# Patient Record
(demographics unavailable — no encounter records)

---

## 2024-10-16 NOTE — HISTORY OF PRESENT ILLNESS
[FreeTextEntry1] : The patient is a 70-year-old white female with a past medical history remarkable for coronary artery disease, hypertension, hypercholesterolemia, obesity, smoking, a family history of premature coronary artery disease, an abnormal ECG and recently diagnosed atrial fibrillation (8/25/2024). Holter monitoring demonstrated sinus rhythm with an average heart rate of 55 bpm.  She experienced rare APCs and PVCs.  5 symptoms corresponded to sinus rhythm.  She experienced a 46 beat run of asymptomatic SVT.  The patient developed an episode of symptomatic, rapid atrial fibrillation on October 4 that lasted for 30 minutes.  Cardizem 30 mg 1/2 tablet each evening was added to her medical regimen.  She reports that she has experienced no further episodes She has decreased her caffeine intake from 6 cups to 2 to 3 cups/day.  She has discontinued her weight loss supplement.  She consumes only 24 ounces of water daily.

## 2024-10-16 NOTE — DISCUSSION/SUMMARY
[FreeTextEntry1] : atrial fibrillation/SVT  8/25/2024, asymptomatic aside from severe fatigue, converted with IV Cardizem Anticoagulation with Eliquis  Rate control with Cardizem  mg daily  Reported 30 minutes of symptomatic atrial fibrillation 10/4/2024 Cardizem 30 mg 1/2 tablet nightly added.  No additional symptomatic episodes of A-fib reported. EP evaluation pending  Coronary artery disease. Aspirin discontinued since Eliquis was prescribed   hypertension Well-controlled.  Continue medical therapy.    hypercholesterolemia Acceptable 4/22/2024    obesity BMI 35. Diet, exercise, and weight loss rediscussed   RTO after EP evaluation

## 2024-10-16 NOTE — CARDIOLOGY SUMMARY
[de-identified] : - CAD: CARDIAC CATHETERIZATION 10/14/16, LAD 25%, PHARMACOLOGIC NUCLEAR STRESS TEST: 5/28/2024, normal, EF 74%  (4/5/2022, normal, EF >65%) - CAROTID ULTRASOUND: 5/15/2023, nonobstructive disease - ECHOCARDIOGRAM: 9/6/2024, EF 60%, LAE, mild mitral regurgitation and tricuspid regurgitation PA systolic 31 (5/15/2023, EF 60%, trace mitral regurgitation and tricuspid regurgitation RVSP 27) -Hypertension -Hypercholesterolemia -Smoker -Obesity: BMI 39 -Family history of premature CAD: Father MI 50s -Bradycardia: Asymptomatic -Atrial fibrillation: 8/25/2024 [facility], [id number] Vasc score of 4, HOLTER MONITOR: 9/9/2024, 7 days, NSR 55 () rare APCs and PVCs, 5 symptoms = SR, asymptomatic SVT 46 beats.

## 2024-10-16 NOTE — PHYSICAL EXAM

## 2024-12-17 NOTE — HISTORY OF PRESENT ILLNESS
[FreeTextEntry1] : The patient is a 71-year-old female referred for management of atrial fibrillation by Dr. Hamilton. The patient has a history of paroxysmal atrial fibrillation, HTN, obesity and non-obstructive CAD. The patient first presented to [facility] on 8/25/24 with symptomatic atrial fibrillation with RVR. She was experiencing extreme fatigue. She converted to SR after receiving IV Cardizem. She was started on Eliquis 5 mg bid for stroke prophylaxis and Cardizem 120 mg daily. She had started taking weight loss gummies in 6/2024 which she stopped at this time. She had another episode of atrial fibrillation on 10/4/2024 as noted on her Apple watch which was again associated with symptoms of fatigue. She converted on SR spontaneously after taking extra Cardizem. She has not experienced any additional episodes since then. Most recent TTE revealed preserved EF and a mildly dilated LA with mild MR.

## 2024-12-17 NOTE — DISCUSSION/SUMMARY
[FreeTextEntry1] : In summary, the patient is a 71-year-old female with a history of HTN and paroxysmal atrial fibrillation. CHADSVASC score is 3. She is currently on Eliquis 5 mg bid and Cardizem 120 mg daily. We discussed long-term management of atrial fibrillation including rate and rhythm control approaches. I recommended an ablation procedure for rhythm control, which she prefers as well. She wants to avoid long-term medical therapy. We discussed the details of the procedure including potential complications which include but are not limited to bleeding, hematoma, infection, damage to blood vessels, cardiac perforation, damage to the conduction system requiring pacemaker, MI, stroke and DVT. The patient expressed understanding and wishes to proceed. She will hold Eliquis on the day of the procedure.  [EKG obtained to assist in diagnosis and management of assessed problem(s)] : EKG obtained to assist in diagnosis and management of assessed problem(s)

## 2024-12-17 NOTE — CARDIOLOGY SUMMARY
[de-identified] : 12/17/24: sinus rhythm [de-identified] : 9/6/24: EF 60%, mildly dilated LA, mild MR, mild TR

## 2025-01-22 NOTE — DISCUSSION/SUMMARY
[FreeTextEntry1] : atrial fibrillation/SVT  8/25/2024, asymptomatic aside from severe fatigue, converted with IV Cardizem Anticoagulation with Eliquis  Rate control with Cardizem  mg daily  Reported 30 minutes of symptomatic atrial fibrillation 10/4/2024 Cardizem 30 mg 1/2 tablet nightly added.  No additional symptomatic episodes of A-fib reported. Status post EP evaluation recommending restoration of sinus rhythm with an ablation   Coronary artery disease. Aspirin discontinued since Eliquis was prescribed   hypertension Well-controlled.  Continue medical therapy.    hypercholesterolemia Acceptable 4/22/2024    obesity BMI 36 Diet, exercise, and weight loss rediscussed   RTO 6 months

## 2025-01-22 NOTE — CARDIOLOGY SUMMARY
[de-identified] : - CAD: CARDIAC CATHETERIZATION 10/14/16, LAD 25%, PHARMACOLOGIC NUCLEAR STRESS TEST: 5/28/2024, normal, EF 74%  (4/5/2022, normal, EF >65%) - CAROTID ULTRASOUND: 5/15/2023, nonobstructive disease - ECHOCARDIOGRAM: 9/6/2024, EF 60%, LAE, mild mitral regurgitation and tricuspid regurgitation PA systolic 31 (5/15/2023, EF 60%, trace mitral regurgitation and tricuspid regurgitation RVSP 27) -Hypertension -Hypercholesterolemia -Smoker -Obesity: BMI 39 -Family history of premature CAD: Father MI 50s -Bradycardia: Asymptomatic -Atrial fibrillation: 8/25/2024 [facility], [id number] Vasc score of 4, HOLTER MONITOR: 9/9/2024, 7 days, NSR 55 () rare APCs and PVCs, 5 symptoms = SR, asymptomatic SVT 46 beats.

## 2025-01-22 NOTE — HISTORY OF PRESENT ILLNESS
[FreeTextEntry1] : The patient is a 71-year-old white female with a past medical history remarkable for coronary artery disease, hypertension, hypercholesterolemia, obesity, smoking, a family history of premature coronary artery disease, an abnormal ECG and recently diagnosed atrial fibrillation (8/25/2024). Holter monitoring demonstrated sinus rhythm with an average heart rate of 55 bpm.  She experienced rare APCs and PVCs.  5 symptoms corresponded to sinus rhythm.  She experienced a 46 beat run of asymptomatic SVT. The patient developed an episode of symptomatic, rapid atrial fibrillation on October 4 that lasted for 30 minutes.  Cardizem 30 mg 1/2 tablet each evening was added to her medical regimen.  She reports that she has experienced no further episodes She has decreased her caffeine intake from 6 cups to 2 to 3 cups/day.  She has discontinued her weight loss supplement.   The patient has experienced no further episodes of palpitations.  She is scheduled for an atrial fibrillation ablation next month

## 2025-01-22 NOTE — PHYSICAL EXAM

## 2025-03-15 NOTE — PHYSICAL EXAM
[No Acute Distress] : no acute distress [Normal S1, S2] : normal S1, S2 [Clear Lung Fields] : clear lung fields [No Respiratory Distress] : no respiratory distress  [No Edema] : no edema [No Rash] : no rash [Moves all extremities] : moves all extremities [Alert and Oriented] : alert and oriented

## 2025-03-17 NOTE — HISTORY OF PRESENT ILLNESS
[FreeTextEntry1] : The patient is a 71-year-old female referred for management of atrial fibrillation by Dr. Hamilton. The patient has a history of paroxysmal atrial fibrillation, HTN, obesity and non-obstructive CAD. She is now s/p elective ablation for AF (WACA/PVI) on 2/21/25 with Dr. Barlow.    Of note, The patient first presented to [facility] on 8/25/24 with symptomatic atrial fibrillation with RVR. She was experiencing extreme fatigue. She converted to SR after receiving IV Cardizem. She was started on Eliquis 5 mg bid for stroke prophylaxis and Cardizem 120 mg daily. She had started taking weight loss gummies in 6/2024 which she stopped at this time. She had another episode of atrial fibrillation on 10/4/2024 as noted on her Apple watch which was again associated with symptoms of fatigue. She converted to SR spontaneously after taking extra Cardizem. She has not experienced any additional episodes since then. Most recent TTE revealed preserved EF and a mildly dilated LA with mild MR.   Patient presents to the office today s/p AF ablation. Since the procedure, she reports feeling well. She has one brief episode two-days post procedure where her HR was 150 bpm, recorded on her apple watch. Event was self-limiting.  Since then, she has denied symptoms of arrhythmia without c/o palpitations, chest pain/tightness, dizziness, syncope or near syncope.  She has remained on Eliquis 5 mg BID and is tolerating without bleeding events or falls. Cardizem was discontinued post procedure for bradycardia.   B/L groin sites are healed well approximated without erythema, edema, drainage, exudate or signs of infection.  EKG today reveals:  SB 52 bpm.

## 2025-03-17 NOTE — CARDIOLOGY SUMMARY
[de-identified] : 3/17/25: SB HR 52 bpm  12/17/24: sinus rhythm [de-identified] : 9/6/24: EF 60%, mildly dilated LA, mild MR, mild TR

## 2025-03-17 NOTE — DISCUSSION/SUMMARY
[FreeTextEntry1] : 71 with paroxysmal atrial fibrillation, HTN, obesity and non-obstructive CAD, who presented electively today 2/21/25 for and is now s/p elective Pulsed fieldablation of atrial fibrillation (WACA / PVI) on 2/21/25 with Dr. Barlow.   Ms Jasmine presented to the office today s/p ablation. Overall, she has been feeling very well. She has remained free from arrhythmia symptoms. She had one self limiting episode of AF/High HR in the 150's two days post procedure. B/L groin sites are now healed well without s/s of infection or complications. She has been maintained on Eliquis for OAC and will continue this. She is maintaining SR on EKG today.   Recommendation - Continue Eliquis 5 mg BID - Repeat Holter monitor for 1 week 3 mos post ablation (May 2025) -F/u with Dr. Hamilton -EP f/u in 6 months or sooner PRN.    [EKG obtained to assist in diagnosis and management of assessed problem(s)] : EKG obtained to assist in diagnosis and management of assessed problem(s)

## 2025-03-17 NOTE — HISTORY OF PRESENT ILLNESS
[FreeTextEntry1] : The patient is a 71-year-old female referred for management of atrial fibrillation by Dr. Hamilton. The patient has a history of paroxysmal atrial fibrillation, HTN, obesity and non-obstructive CAD. She is now s/p elective ablation for AF (WACA/PVI) on 2/21/25 with Dr. Barlow.    Of note, The patient first presented to [facility] on 8/25/24 with symptomatic atrial fibrillation with RVR. She was experiencing extreme fatigue. She converted to SR after receiving IV Cardizem. She was started on Eliquis 5 mg bid for stroke prophylaxis and Cardizem 120 mg daily. She had started taking weight loss gummies in 6/2024 which she stopped at this time. She had another episode of atrial fibrillation on 10/4/2024 as noted on her Apple watch which was again associated with symptoms of fatigue. She converted to SR spontaneously after taking extra Cardizem. She has not experienced any additional episodes since then. Most recent TTE revealed preserved EF and a mildly dilated LA with mild MR.   Patient presents to the office today s/p AF ablation. Since the procedure, she reports feeling well. She has one brief episode two-days post procedure where her HR was 150 bpm, recorded on her apple watch. Event was self-limiting.  Since then, she has denied symptoms of arrhythmia without c/o palpitations, chest pain/tightness, dizziness, syncope or near syncope.  She has remained on Eliquis 5 mg BID and is tolerating without bleeding events or falls. Cardizem was discontinued post procedure for bradycardia.   B/L groin sites are healed well approximated without erythema, edema, drainage, exudate or signs of infection.  EKG today reveals:  SB 52 bpm.  Meds and therapy

## 2025-03-17 NOTE — CARDIOLOGY SUMMARY
[de-identified] : 3/17/25: SB HR 52 bpm  12/17/24: sinus rhythm [de-identified] : 9/6/24: EF 60%, mildly dilated LA, mild MR, mild TR

## 2025-03-17 NOTE — REVIEW OF SYSTEMS
[Negative] : Heme/Lymph [Feeling Fatigued] : not feeling fatigued [SOB] : no shortness of breath [Dyspnea on exertion] : not dyspnea during exertion [Chest Discomfort] : no chest discomfort [Lower Ext Edema] : no extremity edema [Palpitations] : no palpitations [Syncope] : no syncope [Cough] : no cough [Dizziness] : no dizziness [Easy Bleeding] : no tendency for easy bleeding [Easy Bruising] : no tendency for easy bruising [FreeTextEntry5] : see HPI

## 2025-06-18 NOTE — CARDIOLOGY SUMMARY
[de-identified] : - CAD: CARDIAC CATHETERIZATION 10/14/2016, LAD 25%, PHARMACOLOGIC NUCLEAR STRESS TEST: 5/28/2024, normal, EF 74%  - CAROTID ULTRASOUND: 5/15/2023, nonobstructive disease - ECHOCARDIOGRAM: 9/6/2024, EF 60%, LAE, mild mitral regurgitation and tricuspid regurgitation PA systolic 31  -Hypertension -Hypercholesterolemia -Smoker -Morbid Obesity: BMI 44 -Family history of premature CAD: Father MI 50s -Bradycardia: Asymptomatic -Atrial fibrillation: 8/25/2024 [facility], [id number] Vasc score of 4, atrial fibrillation ablation 2/21/2025  (HOLTER MONITOR: 9/9/2024, 7 days, NSR 55 () rare APCs and PVCs, 5 symptoms = SR, asymptomatic SVT 46 beats)

## 2025-06-18 NOTE — DISCUSSION/SUMMARY
[FreeTextEntry1] : atrial fibrillation/SVT  Status post AFib ablation 2/21/2025  Post ablation Holter monitoring demonstrated sustained SVT.   EP reevaluation pending   Coronary artery disease. Aspirin discontinued since Eliquis was prescribed   hypertension Well-controlled.  Continue medical therapy.    hypercholesterolemia Acceptable 4/22/2024.  Fasting lipids requested from the patient's PCP  Morbid obesity BMI 44 Obesity consultation ordered   RTO 6 months

## 2025-06-18 NOTE — PHYSICAL EXAM

## 2025-06-18 NOTE — HISTORY OF PRESENT ILLNESS
[FreeTextEntry1] : The patient is a 71-year-old white female with a past medical history remarkable for coronary artery disease, hypertension, hypercholesterolemia, obesity, smoking, a family history of premature coronary artery disease, an abnormal ECG and atrial fibrillation s/p ablation 2/21/2025. The patient's cholesterol was acceptable 4/22/2024.  She has decreased her caffeine intake from 6 cups to 2 to 3 cups/day.  She has discontinued her weight loss supplement.  The patient's BMI is now 44. She has not experienced palpitations since the weekend after her ablation.  Outpatient telemetry reported sustained SVT post ablation.  She is chest pain and dyspnea free.  She does not exercise.

## 2025-07-10 NOTE — PHYSICAL EXAM
negative... [No Acute Distress] : no acute distress [Normal S1, S2] : normal S1, S2 [Clear Lung Fields] : clear lung fields [No Respiratory Distress] : no respiratory distress  [No Edema] : no edema [No Rash] : no rash [Moves all extremities] : moves all extremities [Alert and Oriented] : alert and oriented

## 2025-07-11 NOTE — HISTORY OF PRESENT ILLNESS
[FreeTextEntry1] :  71-year-old white female with a past medical history remarkable for coronary artery disease, hypertension, hypercholesterolemia, obesity, smoking, a family history of premature coronary artery disease, an abnormal ECG and atrial fibrillation s/p ablation 2/21/2025.  obesity medicine evaluation difficult losing weight despite improvement in nutrition and increased exercise has spinal issues requiring surgery had some weight loss with extreme diet but gained back because extreme diet was not sustainable BMI >40 tobacco use has not tried GLP-1 medications in past not interested in bariatric surgery at this time. has history of cardiovascular disease including ablation, CAD.

## 2025-07-11 NOTE — DISCUSSION/SUMMARY
[FreeTextEntry1] : Patient presents for: evaluation in preventive cardiac clinic obesity clinic   HLD obesity >40 hx of ablation CAD     Orders Obesity Discussed for 15 minutes with this adult patient, BMI >30. I have instructed the patient to follow a 1500 calories meal plan emphasizing low saturated fat sources and protein with low amount of sodium.  Information on avoiding fast food, fried foods, food with high fat content was suggested, plant based low fat, high fiber diet. We reviewed the efforts of weight reduction identifying 3500 calories in pound of body fat and the need to gradually achieve a long term basis for weight reduction discussed the need to reduce calories for what her current patterns are and to hopefully increase physical activity as well. We discussed menu selection as well as food preparation techniques. Educated patient on 150 minutes of moderate intensity exercise weekly with strength training twice weekly. Encouraged patient to monitor physical activity  Discussed the importance of a balanced, healthy diet of nourishing foods and consistent meal pattern for long-term success and health maintenance. Encouraged to increase water intake to facilitate adequate hydration and to cut out sugary drinks and alcohol. Pt educated on eating 4-6 small meals per day, that are high in protein for hunger regulation. Pt educated on cutting out high-sugar content foods sabotaging wt loss Pt verbalized understanding  Plan: we discussed obesity implications with general health and cardiovascular process. I discussed in detail issues that can potentially arise with the cardiovascular and general health system. The patient is actively in the gym trying to lose weight. Does not want bariatric surgery nor medications for obesity at this time. offered nutritional counseling referral and was deferred at this time.  Discussed medication options: Patient counseled on diet and exercise 150mins of moderate intensity exercise/weekly Discussed conservative management for weight loss Discussed nutrition and dietician to help with improvement of diet. Counseled on AE of medications Discussed oral medications vs injectable medications discussed long term AE of weight loss meds and long term effects of obesity. Pt declined any hx of MEN syndrome or thyroid medullary carcinoma or pancreatitis, eye concerns. will need yearly eye exam if on GLP-1. Understands to go to the gym and exercise with light weights, increase protein intake to prevent skeletal muscle decline. Discussed metformin off label use for weight loss was also discussed  GLP-1 Weight Management Counseling Patient counseled regarding initiation of GLP-1 receptor agonist therapy (e.g., semaglutide, liraglutide) for management of obesity and weight reduction. Rationale: GLP-1 agonists are FDA-approved for chronic weight management in adults with BMI 30 or 27 with weight-related comorbidities such as Obstructive sleep apnea. Chosen due to inadequate weight loss with lifestyle modification alone.  Benefits: Promotes significant weight loss when paired with diet and exercise. May reduce risk of obesity-related conditions (e.g., diabetes, hypertension, cardiovascular disease). Low risk for hypoglycemia. Risks and Common Side Effects:  Gastrointestinal: Nausea, vomiting, diarrhea, constipation, decreased appetite (most common; often transient and dose-dependent). Less common: Dyspepsia, abdominal discomfort, headache, fatigue. Rare but serious: Pancreatitis, gallbladder disease, medullary thyroid carcinoma (reported in animal studies), hypoglycemia (mainly if used with certain diabetes medications). Potential worsening of diabetic retinopathy (rare, mainly in patients with pre-existing retinopathy and rapid glycemic improvement). Contraindications/Precautions Discussed: Personal or family history of medullary thyroid carcinoma or Multiple Endocrine Neoplasia syndrome type 2. History of pancreatitis. denied by patient.  Counseling Points: Medication should be used along with sustained lifestyle changes (healthy diet and increased physical activity). Instructed to start at a low dose and titrate gradually to minimize GI side effects. Advise to seek medical attention for severe abdominal pain, persistent vomiting, or symptoms suggestive of pancreatitis. Advised of the importance of regular follow-up for monitoring weight, efficacy, tolerability, and any side effects. Patient Understanding: Patient verbalized understanding of the medication's purpose, benefits, risks, potential side effects, and the ongoing lifestyle changes required. Questions addressed to satisfaction.  Shared decision making performed; patient elected to proceed with therapy. Monitoring and follow-up plan established.    Discussed to strongly pursue preventative cardiology, lifestyle modifications which are necessary for long-term cardiovascular health.   Provided structured, face-to-face preventive counseling focused on  Advised a plant based, limited salt, low fat, minimal dairy  diet, stress reduction/psychosomatic management, sleep hygiene/JACQUIE testing and healthy weight loss, annual influenza vaccine, medication compliance, high risk behavior mitigation (I.e. tobacco abstinence, alcohol abstinence, recreational/illicit drug use abstinence), increased exercise activity as per AHA/ACC standard for long term cardiovascular risk reduction.  lasting approximately 15 minutes. Discussed health risks, provided evidence-based guidance, and engaged the patient in shared decision-making to promote behavior change. Patient was receptive and actively participated in the discussion.

## 2025-07-11 NOTE — PHYSICAL EXAM
[Well Developed] : well developed [Well Nourished] : well nourished [No Acute Distress] : no acute distress [Obese] : obese [Normal Conjunctiva] : normal conjunctiva [Normal Venous Pressure] : normal venous pressure [No Carotid Bruit] : no carotid bruit [Normal S1, S2] : normal S1, S2 [No Murmur] : no murmur [No Rub] : no rub [No Gallop] : no gallop [Clear Lung Fields] : clear lung fields [Good Air Entry] : good air entry [No Respiratory Distress] : no respiratory distress  [Normal Bowel Sounds] : normal bowel sounds [Normal Gait] : normal gait [No Edema] : no edema [No Cyanosis] : no cyanosis [No Clubbing] : no clubbing [No Varicosities] : no varicosities [No Rash] : no rash [No Skin Lesions] : no skin lesions [Moves all extremities] : moves all extremities [No Focal Deficits] : no focal deficits [Normal Speech] : normal speech [Alert and Oriented] : alert and oriented [Normal memory] : normal memory

## 2025-07-11 NOTE — ASSESSMENT
[FreeTextEntry1] : 71-year-old white female with a past medical history remarkable for coronary artery disease, hypertension, hypercholesterolemia, obesity, smoking, a family history of premature coronary artery disease, an abnormal ECG and atrial fibrillation s/p ablation 2/21/2025.  obesity medicine evaluation

## 2025-07-15 NOTE — DISCUSSION/SUMMARY
[FreeTextEntry1] : In summary, 71 with paroxysmal atrial fibrillation s/p ablation, HTN, obesity and non-obstructive CAD. Except for one short episode in the early post-ablation period there is no evidence of AF recurrence since. She is on Eliquis 5 mg bid (CHADSVASC 2). She has symptoms of fatigue and exhaustion, but they appear unrelated to any arrhythmias or current medical therapy. If she remains AF free in the next 6 months could consider discontinuing Eliquis with constant monitoring on her Apple Watch.   f/u in 4-6 months.   [EKG obtained to assist in diagnosis and management of assessed problem(s)] : EKG obtained to assist in diagnosis and management of assessed problem(s)

## 2025-07-15 NOTE — CARDIOLOGY SUMMARY
[de-identified] : 7/15/25: sinus rhythm at 54 bpm 3/17/25: SB HR 52 bpm  12/17/24: sinus rhythm [de-identified] : 9/6/24: EF 60%, mildly dilated LA, mild MR, mild TR

## 2025-07-15 NOTE — CARDIOLOGY SUMMARY
[de-identified] : 7/15/25: sinus rhythm at 54 bpm 3/17/25: SB HR 52 bpm  12/17/24: sinus rhythm [de-identified] : 9/6/24: EF 60%, mildly dilated LA, mild MR, mild TR

## 2025-07-15 NOTE — HISTORY OF PRESENT ILLNESS
[FreeTextEntry1] : The patient is a 71-year-old female presenting for follow-up. The patient has a history of paroxysmal atrial fibrillation s/p ablation, HTN, obesity and non-obstructive CAD.   To summarize, the patient first presented to [facility] on 8/25/24 with symptomatic atrial fibrillation with RVR. She was experiencing extreme fatigue. She converted to SR after receiving IV Cardizem. She was started on Eliquis 5 mg bid for stroke prophylaxis and Cardizem 120 mg daily. She had started taking weight loss gummies in 6/2024 which she stopped at this time. She had another episode of atrial fibrillation on 10/4/2024 as noted on her Apple watch which was again associated with symptoms of fatigue. She converted to SR spontaneously after taking extra Cardizem. She has not experienced any additional episodes since then. TTE revealed preserved EF and a mildly dilated LA with mild MR. She underwent PFA ablation for atrial fibrillation on 2/1/25 (PVI, posterior box). She has one brief episode two-days post procedure where her HR was 150 bpm, recorded on her apple watch. Event was self-limiting. She wore a week long monitor recently which did not show any atrial fibrillation.   Since then, she has denied symptoms of arrhythmia without c/o palpitations, chest pain/tightness, dizziness, syncope or near syncope. She has remained on Eliquis 5 mg BID and is tolerating without bleeding events or falls. Cardizem was discontinued post procedure for bradycardia. She complains of fatigue and exhaustion. She also has clinical depression and is on Zoloft.

## 2025-07-15 NOTE — HISTORY OF PRESENT ILLNESS
[FreeTextEntry1] : The patient is a 71-year-old female presenting for follow-up. The patient has a history of paroxysmal atrial fibrillation s/p ablation, HTN, obesity and non-obstructive CAD.   To summarize, the patient first presented to [facility] on 8/25/24 with symptomatic atrial fibrillation with RVR. She was experiencing extreme fatigue. She converted to SR after receiving IV Cardizem. She was started on Eliquis 5 mg bid for stroke prophylaxis and Cardizem 120 mg daily. She had started taking weight loss gummies in 6/2024 which she stopped at this time. She had another episode of atrial fibrillation on 10/4/2024 as noted on her Apple watch which was again associated with symptoms of fatigue. She converted to SR spontaneously after taking extra Cardizem. She has not experienced any additional episodes since then. TTE revealed preserved EF and a mildly dilated LA with mild MR. She underwent PFA ablation for atrial fibrillation on 2/1/25 (PVI, posterior box). She has one brief episode two-days post procedure where her HR was 150 bpm, recorded on her apple watch. Event was self-limiting. She wore a week long monitor recently which did not show any atrial fibrillation.   Since then, she has denied symptoms of arrhythmia without c/o palpitations, chest pain/tightness, dizziness, syncope or near syncope. She has remained on Eliquis 5 mg BID and is tolerating without bleeding events or falls. Cardizem was discontinued post procedure for bradycardia. She complains of fatigue and exhaustion. She also has clinical depression and is on Zoloft.